# Patient Record
Sex: MALE | Race: WHITE | NOT HISPANIC OR LATINO | Employment: FULL TIME | URBAN - METROPOLITAN AREA
[De-identification: names, ages, dates, MRNs, and addresses within clinical notes are randomized per-mention and may not be internally consistent; named-entity substitution may affect disease eponyms.]

---

## 2019-06-06 ENCOUNTER — TRANSCRIBE ORDERS (OUTPATIENT)
Dept: ADMINISTRATIVE | Facility: HOSPITAL | Age: 54
End: 2019-06-06

## 2019-06-06 DIAGNOSIS — N28.89 RENAL MASS: Primary | ICD-10-CM

## 2019-06-19 ENCOUNTER — HOSPITAL ENCOUNTER (OUTPATIENT)
Dept: CT IMAGING | Facility: HOSPITAL | Age: 54
Discharge: HOME | End: 2019-06-19
Admitting: FAMILY MEDICINE

## 2019-06-19 DIAGNOSIS — N28.89 RENAL MASS: ICD-10-CM

## 2019-06-19 LAB — CREAT BLDA-MCNC: 1.4 MG/DL (ref 0.6–1.3)

## 2019-06-19 PROCEDURE — 82565 ASSAY OF CREATININE: CPT

## 2019-06-19 PROCEDURE — 0 IOPAMIDOL PER 1 ML: Performed by: FAMILY MEDICINE

## 2019-06-19 PROCEDURE — 74170 CT ABD WO CNTRST FLWD CNTRST: CPT

## 2019-06-19 RX ADMIN — IOPAMIDOL 95 ML: 755 INJECTION, SOLUTION INTRAVENOUS at 16:02

## 2022-03-16 ENCOUNTER — OFFICE VISIT (OUTPATIENT)
Dept: SLEEP MEDICINE | Facility: HOSPITAL | Age: 57
End: 2022-03-16

## 2022-03-16 VITALS
OXYGEN SATURATION: 96 % | SYSTOLIC BLOOD PRESSURE: 133 MMHG | WEIGHT: 179.6 LBS | BODY MASS INDEX: 27.31 KG/M2 | HEART RATE: 66 BPM | DIASTOLIC BLOOD PRESSURE: 81 MMHG

## 2022-03-16 DIAGNOSIS — G47.33 OBSTRUCTIVE SLEEP APNEA, ADULT: Primary | ICD-10-CM

## 2022-03-16 DIAGNOSIS — E66.3 OVERWEIGHT: ICD-10-CM

## 2022-03-16 PROCEDURE — 99203 OFFICE O/P NEW LOW 30 MIN: CPT | Performed by: INTERNAL MEDICINE

## 2022-03-16 RX ORDER — AMLODIPINE BESYLATE 10 MG/1
TABLET ORAL
COMMUNITY
Start: 2022-03-13

## 2022-03-16 RX ORDER — LANOLIN ALCOHOL/MO/W.PET/CERES
1 CREAM (GRAM) TOPICAL 3 TIMES DAILY
COMMUNITY

## 2022-03-16 RX ORDER — POTASSIUM CHLORIDE 20 MEQ/1
TABLET, EXTENDED RELEASE ORAL
COMMUNITY
Start: 2021-12-12

## 2022-03-16 RX ORDER — MULTIPLE VITAMINS W/ MINERALS TAB 9MG-400MCG
1 TAB ORAL DAILY
COMMUNITY

## 2022-03-16 RX ORDER — FUROSEMIDE 40 MG/1
TABLET ORAL
COMMUNITY
Start: 2021-12-12

## 2022-03-30 NOTE — PROGRESS NOTES
Chief Complaint  Snoring and obstructive sleep apnea  Subjective         Maksim Chris presents to [facility] for the evaluation of snoring and obstructive sleep apnea.  His primary care physician is Dr. Mari.  He is seen in person in the sleep clinic.  History of Present Illness  Patient had a history of snoring for about 10 years.  He had a sleep study in this clinic in 2015 and was diagnosed with severe obstructive sleep apnea.  He titrated to his BiPAP machine.  He apparently used it fairly regularly until 2020.  He had some problem with humidifier.  He is apparently been diagnosed with diabetes and he is interested in trying to get back on BiPAP.    He still has snoring and apneas noted.  He denies awakening gasping for breath.  He is not rested on arising in the morning.  He denies morning headaches.    He will awaken with a sore throat.  He has broken his nose previously.  He has been told he had a deviated septum and he has occasional trouble breathing through his nose.  He has a history of nasal allergies.  He will fall asleep if sitting quietly during the day.  He denies problems driving.  He has occasional kicking of his legs at night.  He denies that it keeps him awake.  He denies having chronic pain that keeps him awake.    He goes to bed between 10:30 PM and 12:30 AM.  He will fall asleep in 30 minutes.  He awakens once during the night.  He thinks he gets 4 to 6 hours of sleep but is not rested.  He says his dog sometimes keep him awake at night.  He has had hypertension known for 30 years.  He has had diabetes known for 2 years.  He denies any history of known coronary artery disease.  He poorly has occasional PVCs.    Past medical history:    Allergies: He is allergic to dust and pollen    Habits: Smoking: Without    Ethanol: Without    Caffeine: He has 2-3 abiola per day    Medical illnesses: He has history of diabetes and hypertension    Medications:amLODIPine  (NORVASC) 10 MG tablet    ASPIRIN 81 PO    furosemide (LASIX) 40 MG tablet    glucosamine-chondroitin 500-400 MG per tablet    metFORMIN (GLUCOPHAGE) 500 MG tablet    multivitamin with minerals (MULTIVITAMIN ADULT PO)    Omega-3 Fatty Acids (FISH OIL PO)    potassium chloride (K-DUR,KLOR-CON) 20 MEQ CR tablet      Surgeries: Has had hernia repair and wisdom teeth extraction    Family history: Positive for diabetes, heart disease, hypertension, stroke, obesity, sleep apnea, cancer.    Review of systems: Positives include fatigue, sore throat, eye itching, apneas, environmental allergies, decreased concentration.  Other systems reviewed and negative.    Collins score is 11/24  Objective   Vital Signs:   /81   Pulse 66   Wt 81.5 kg (179 lb 9.6 oz)   SpO2 96%   BMI 27.31 kg/m²          Physical Exam patient appears to be awake and alert.  He does not appear to be in acute respiratory distress.    He is normocephalic.  He has Mallampati class II anatomy.    Lungs are clear.    Cardiac exam revealed normal S1-S2.    Extremities showed no edema.  Result Review :    Patient sleep study April 24, 2015.  He had an AHI of 37.1.  His weight was 226 pounds.  He titrated to bilevel of 14/10     Assessment and Plan  Diagnoses and all orders for this visit:    1. Obstructive sleep apnea, adult (Primary)  -     Detailed AutoPAP Order    2. Overweight    Patient has a history of severe obstructive sleep apnea.  He still has a history of snoring and observed apneas.  He has lost a significant amount of weight since his previous study.  He is interested in getting back on therapy however.  We will place an order for new bilevel machine new supplies.  We have discussed other potential therapies including weight control, oral appliance, and surgery.  We have discussed the long-term consequences of untreated obstructive sleep apnea.  These include hypertension, diabetes, heart disease, stroke, and dementia.  He is encouraged to  achieve ideal body weight.  He is encouraged to avoid alcohol and sedatives close to bedtime.  He is encouraged to practice lateral position sleep.  We will see him back then in roughly 2 months after he gets his new machine and we can download the machine to make certain it is being effective.  I spent 35 minutes caring for Maksim on this date of service. This time includes time spent by me in the following activities:reviewing tests, obtaining and/or reviewing a separately obtained history, performing a medically appropriate examination and/or evaluation , counseling and educating the patient/family/caregiver, ordering medications, tests, or procedures and documenting information in the medical record  Follow Up   Return in about 2 months (around 5/16/2022) for 31 to 90 days after PAP setup, Next scheduled follow-up.  Patient was given instructions and counseling regarding his condition or for health maintenance advice. Please see specific information pulled into the AVS if appropriate.   Darion Parker MD [facility]  Sleep Medicine  Pulmonary and Critical Care Medicine

## 2022-04-19 ENCOUNTER — TELEPHONE (OUTPATIENT)
Dept: SLEEP MEDICINE | Facility: HOSPITAL | Age: 57
End: 2022-04-19

## 2022-04-19 NOTE — TELEPHONE ENCOUNTER
PATIENT CALLED AND LVM, RETURNED PATIENTS CALL CONCERNING ORDER FOR CPAP AND COMPLIANCE FU. PATIENTS ORDER WAS FAXED ON 03/30/22 TO [facility], I WENT OVER THIS WITH THE PATIENT AND HE CONFIRMED UNDERSTANDING AND WILL CALL [facility]. I CANCELLED PATIENTS COMPLIANCE FU FOR MAY 19TH AND REQUESTED PATIENT CALL AS SOON AS HE IS SET UP WITH HIS CPAP. PATIENT AGREED.

## 2022-04-20 NOTE — TELEPHONE ENCOUNTER
[facility] STATED THEY NEVER REC'D ORDER AND CHART INFO FAXED TO THEM ON 03/30/22.  WE HAVE FAXED THE NEW CPAP ORDER AND CHART INFO AGAIN ALONG WITH THE CONFIRMATION FAX SHEET FROM 03/30/22

## 2022-07-26 ENCOUNTER — OFFICE VISIT (OUTPATIENT)
Dept: SLEEP MEDICINE | Facility: HOSPITAL | Age: 57
End: 2022-07-26

## 2022-07-26 VITALS
WEIGHT: 180.6 LBS | HEART RATE: 78 BPM | OXYGEN SATURATION: 97 % | BODY MASS INDEX: 26.75 KG/M2 | DIASTOLIC BLOOD PRESSURE: 71 MMHG | HEIGHT: 69 IN | SYSTOLIC BLOOD PRESSURE: 135 MMHG

## 2022-07-26 DIAGNOSIS — G47.33 OBSTRUCTIVE SLEEP APNEA, ADULT: Primary | ICD-10-CM

## 2022-07-26 PROCEDURE — 99213 OFFICE O/P EST LOW 20 MIN: CPT | Performed by: NURSE PRACTITIONER

## 2022-07-26 NOTE — PROGRESS NOTES
Sleep Clinic Follow Up Note    Chief Complaint  Follow-up    Subjective     History of Present Illness (from previous evaluation on 3/30/2022):  Patient had a history of snoring for about 10 years.  He had a sleep study in this clinic in 2015 and was diagnosed with severe obstructive sleep apnea.  He titrated to his BiPAP machine.  He apparently used it fairly regularly until 2020.  He had some problem with humidifier.  He is apparently been diagnosed with diabetes and he is interested in trying to get back on BiPAP.     He still has snoring and apneas noted.  He denies awakening gasping for breath.  He is not rested on arising in the morning.  He denies morning headaches.     He will awaken with a sore throat.  He has broken his nose previously.  He has been told he had a deviated septum and he has occasional trouble breathing through his nose.  He has a history of nasal allergies.  He will fall asleep if sitting quietly during the day.  He denies problems driving.  He has occasional kicking of his legs at night.  He denies that it keeps him awake.  He denies having chronic pain that keeps him awake.     He goes to bed between 10:30 PM and 12:30 AM.  He will fall asleep in 30 minutes.  He awakens once during the night.  He thinks he gets 4 to 6 hours of sleep but is not rested.  He says his dog sometimes keep him awake at night.  He has had hypertension known for 30 years.  He has had diabetes known for 2 years.  He denies any history of known coronary artery disease.  He poorly has occasional PVCs.  (End copied text)    Interval History:  Maksim Chris is a 57 y.o. male returns for follow up and compliance of CPAP therapy. The patient was last seen on 3/30/22. Overall the patient feels good with regard to therapy though he does continue to have some difficulty getting used to it.  He reports issues with he has headgear causing some pain when he wakes. The device appears to be working appropriately otherwise.  On average the patient sleeps 5 hours per night. They wake  times per night. He has been trying to exercise in order to be tired enough to sleep.The patient reports the following changes to their medical and medication history since they were last seen: None    Further details are as follows:    [place] Scale is: 10/24      1. Sitting and Readin. Watching TV:  3. Sitting quietly in a public place:   4. As a passanger in a car for an hour:  5. Lying down in the afternoon to rest:  6. Sitting and talking to someone:  7. Sitting quietly after lunch without alcohol:  8. In a car while stopped in traffic for a few minutes:    Weight:  Current Weight: 175 lb    Weight change in the last year:  loss: 70 lbs apx    The patient's relevant past medical, surgical, family, and social history reviewed and updated in Epic as appropriate.    PMH:    Past Medical History:   Diagnosis Date   • Diabetes mellitus (HCC)    • Hypertension      Past Surgical History:   Procedure Laterality Date   • UMBILICAL HERNIA REPAIR     • WISDOM TOOTH EXTRACTION         No Known Allergies    MEDS:  Prior to Admission medications    Medication Sig Start Date End Date Taking? Authorizing Provider   amLODIPine (NORVASC) 10 MG tablet  3/13/22  Yes Adolph Lugo MD   ASPIRIN 81 PO Take  by mouth.   Yes Adolph Lugo MD   furosemide (LASIX) 40 MG tablet  21  Yes Adolph Lugo MD   glucosamine-chondroitin 500-400 MG per tablet Take 1 tablet by mouth 3 (Three) Times a Day.   Yes Adolph Lugo MD   metFORMIN (GLUCOPHAGE) 500 MG tablet Take 500 mg by mouth. 9/15/21 9/15/22 Yes Adolph Lugo MD   multivitamin with minerals tablet tablet Take 1 tablet by mouth Daily.   Yes Adolph Lugo MD   Omega-3 Fatty Acids (FISH OIL PO) Take  by mouth.   Yes Adolph Lugo MD   potassium chloride (K-DUR,KLOR-CON) 20 MEQ CR tablet  21  Yes Adolph Lugo MD         FH:  Family History   Problem  "Relation Age of Onset   • Hypertension Mother    • Cancer Father    • Hypertension Father        Objective   Vital Signs:  /71   Pulse 78   Ht 175.3 cm (69\")   Wt 81.9 kg (180 lb 9.6 oz)   SpO2 97%   BMI 26.67 kg/m²     BMI is >= 25 and <30. (Overweight) The following options were offered after discussion;: He has been working on exercise and diet.  Patient has lost approximately 70 pounds.        Physical Exam  Constitutional:       Appearance: Normal appearance.   HENT:      Head: Normocephalic and atraumatic.      Nose: Nose normal.      Mouth/Throat:      Mouth: Mucous membranes are dry.   Cardiovascular:      Rate and Rhythm: Normal rate and regular rhythm.      Heart sounds: No murmur heard.    No friction rub. No gallop.   Pulmonary:      Effort: Pulmonary effort is normal. No respiratory distress.      Breath sounds: Normal breath sounds. No wheezing or rhonchi.   Neurological:      Mental Status: He is alert and oriented to person, place, and time.   Psychiatric:         Behavior: Behavior normal.         Mallampati Score: III (soft and hard palate and base of uvula visible)    CPAP Report:  AHI: 1.0  Days of Usage: 59/60 (98%)  95th percentile pressure: 31.6 cm H2O  Number of Days Greater than 4 hours: 57/60 (95%)  Settings: Mode-V auto, max IPAP 16 cm H2O, min EPAP 6 cm H2O, pressure support 6 cm H2O    Result Review :              Assessment and Plan  This is a very pleasant 57-year-old male who returns for compliance check.  He has been exercising and trying to keep to a normal sleep schedule.  He will contact his [employer] company to see about obtaining a new/different mask and headgear.  Patient will follow-up in 1 year or call sooner should he have questions or need assistance.    Diagnoses and all orders for this visit:    1. Obstructive sleep apnea, adult (Primary)  -     PAP Therapy           The patient continues to use and benefit from CPAP therapy.    1. The patient was counseled regarding " multimodal approach with healthy nutrition, healthy sleep, regular physical activity, social activities, counseling, and medications. Encouraged to practice lateral sleep position. Avoid alcohol and sedatives close to bedtime.     2.  We will refill supplies x1 year.  Return to clinic 1 year or sooner if symptoms warrant.  Patient gave verbal consent today for video visit. I have reviewed the results of my evaluation and impression and discussed my recommendations in detail with the patient.    Follow Up  Return in about 1 year (around 7/26/2023).  Patient was given instructions and counseling regarding his condition or for health maintenance advice. Please see specific information pulled into the AVS if appropriate.       GAETANO Webb, [facility]-BC  Pulmonology, Critical Care, and Sleep Medicine  Electronically signed by GAETANO Fong, 07/26/22, 11:06 AM EDT.

## 2023-07-25 NOTE — PROGRESS NOTES
Sleep Clinic Follow Up Note    Chief Complaint  Follow-up    Subjective     History of Present Illness (from previous encounter on 7/26/2022):  Maksim Chris is a 57 y.o. male returns for follow up and compliance of CPAP therapy. The patient was last seen on 3/30/22. Overall the patient feels good with regard to therapy though he does continue to have some difficulty getting used to it.  He reports issues with he has headgear causing some pain when he wakes. The device appears to be working appropriately otherwise. On average the patient sleeps 5 hours per night. They wake  times per night. He has been trying to exercise in order to be tired enough to sleep.The patient reports the following changes to their medical and medication history since they were last seen: None (End copied text).    Interval History:  Maksim Chris is a 58 y.o. male returns for follow up and compliance of CPAP therapy. The patient was last seen on 7/26/2022. Overall the patient feels with regard to therapy. The device appears to be working appropriately. On average the patient sleeps 5 hours per night. The patient wakes 1-2 times per night.     The patient reports the following changes to their medical and medication history since they were last seen:  None    Further details are as follows:      [place] Scale is (out of 24): Total score: 11     Weight:  Current Weight: 187 lb    Weight change in the last year:  gain: 0 lbs    The patient's relevant past medical, surgical, family, and social history reviewed and updated in Epic as appropriate.    PMH:    Past Medical History:   Diagnosis Date    Diabetes mellitus     Hypertension      Past Surgical History:   Procedure Laterality Date    UMBILICAL HERNIA REPAIR      WISDOM TOOTH EXTRACTION         No Known Allergies    MEDS:  Prior to Admission medications    Medication Sig Start Date End Date Taking? Authorizing Provider   amLODIPine (NORVASC) 10 MG tablet  3/13/22   Provider,  "MD Adolph   ASPIRIN 81 PO Take  by mouth.    Adolph Lugo MD   furosemide (LASIX) 40 MG tablet  12/12/21   Adolph Lugo MD   glucosamine-chondroitin 500-400 MG per tablet Take 1 tablet by mouth 3 (Three) Times a Day.    Adolph Lugo MD   metFORMIN (GLUCOPHAGE) 500 MG tablet Take 500 mg by mouth. 9/15/21 9/15/22  Adolph Lugo MD   multivitamin with minerals tablet tablet Take 1 tablet by mouth Daily.    Adolph Lugo MD   Omega-3 Fatty Acids (FISH OIL PO) Take  by mouth.    Adolph Lugo MD   potassium chloride (K-DUR,KLOR-CON) 20 MEQ CR tablet  12/12/21   Adolph Lugo MD         FH:  Family History   Problem Relation Age of Onset    Hypertension Mother     Cancer Father     Hypertension Father        Objective   Vital Signs:  /78 (BP Location: Left arm, Patient Position: Sitting)   Pulse 69   Ht 175.3 cm (69\")   Wt 85.1 kg (187 lb 9.6 oz)   SpO2 97%   BMI 27.70 kg/m²     BMI is >= 25 and <30. (Overweight) The following options were offered after discussion;: working on diet and exercise.         Physical Exam  Vitals reviewed.   Constitutional:       Appearance: Normal appearance.   HENT:      Head: Normocephalic and atraumatic.      Nose: Nose normal.      Mouth/Throat:      Mouth: Mucous membranes are moist.   Cardiovascular:      Rate and Rhythm: Normal rate and regular rhythm.      Heart sounds: No murmur heard.    No friction rub. No gallop.   Pulmonary:      Effort: Pulmonary effort is normal. No respiratory distress.      Breath sounds: Normal breath sounds. No wheezing or rhonchi.   Neurological:      Mental Status: He is alert and oriented to person, place, and time.   Psychiatric:         Behavior: Behavior normal.             Result Review :           CPAP Report:  AHI: 0.8/h  Days of Usage: 62/90 (69%)  Number of Days Greater than 4 hours: 53/90 (59%)  Average time (days used): 5 hours  95th Percentile leak: 42.7 " L/min  Settings: V auto-Max IPAP 16 cm H2O, min EPAP 6 cm H2O, pressure support 6 cm H2O.       Assessment and Plan  Maksim Chris is a 58 y.o. male who returns for follow-up compliance of PAP therapy.  Pap report has been reviewed.  Overall usage is at 69% with compliance at 59%.  I have encouraged increase usage.  Patient averages 5 hours on nights used.  He has a large leak at 42.7 L/min.  AHI is 0.8/H with usage.  We have discussed changing masks. I will refill the patient's supplies, and they will return for follow-up and compliance in 1 year or sooner should they have further questions or concerns. He feels that he benefits from pap therapy.     Diagnoses and all orders for this visit:    1. Obstructive sleep apnea, adult (Primary)  -     PAP Therapy           The patient continues to use and benefit from CPAP therapy.    1. The patient was counseled regarding multimodal approach with healthy nutrition, healthy sleep, regular physical activity, social activities, counseling, and medications. Encouraged to practice lateral sleep position. Avoid alcohol and sedatives close to bedtime.     2.  We will refill supplies x1 year.  Return to clinic 1 year or sooner if symptoms warrant. I have reviewed the results of my evaluation and impression and discussed my recommendations in detail with the patient.           Follow Up  Return in about 1 year (around 7/26/2024) for Annual visit.  Patient was given instructions and counseling regarding his condition or for health maintenance advice. Please see specific information pulled into the AVS if appropriate.       GAETANO Webb, ACNP-BC  Pulmonology, Critical Care, and Sleep Medicine

## 2023-07-26 ENCOUNTER — OFFICE VISIT (OUTPATIENT)
Dept: SLEEP MEDICINE | Facility: HOSPITAL | Age: 58
End: 2023-07-26
Payer: COMMERCIAL

## 2023-07-26 VITALS
WEIGHT: 187.6 LBS | DIASTOLIC BLOOD PRESSURE: 78 MMHG | SYSTOLIC BLOOD PRESSURE: 125 MMHG | BODY MASS INDEX: 27.78 KG/M2 | HEIGHT: 69 IN | HEART RATE: 69 BPM | OXYGEN SATURATION: 97 %

## 2023-07-26 DIAGNOSIS — G47.33 OBSTRUCTIVE SLEEP APNEA, ADULT: Primary | ICD-10-CM

## 2024-02-21 ENCOUNTER — HOSPITAL ENCOUNTER (EMERGENCY)
Age: 59
Discharge: HOME | End: 2024-02-21
Attending: EMERGENCY MEDICINE

## 2024-02-21 VITALS
SYSTOLIC BLOOD PRESSURE: 165 MMHG | OXYGEN SATURATION: 97 % | DIASTOLIC BLOOD PRESSURE: 79 MMHG | HEIGHT: 66 IN | HEART RATE: 80 BPM | TEMPERATURE: 97.9 F | BODY MASS INDEX: 29.73 KG/M2 | WEIGHT: 185 LBS | RESPIRATION RATE: 18 BRPM

## 2024-02-21 DIAGNOSIS — S05.01XA ABRASION OF RIGHT CORNEA, INITIAL ENCOUNTER: Primary | ICD-10-CM

## 2024-02-21 PROCEDURE — 99282 EMERGENCY DEPT VISIT SF MDM: CPT

## 2024-02-21 PROCEDURE — 10002801 HB RX 250 W/O HCPCS

## 2024-02-21 RX ORDER — PROPARACAINE HYDROCHLORIDE 5 MG/ML
1 SOLUTION/ DROPS OPHTHALMIC ONCE
Status: COMPLETED | OUTPATIENT
Start: 2024-02-21 | End: 2024-02-21

## 2024-02-21 RX ORDER — PROPARACAINE HYDROCHLORIDE 5 MG/ML
SOLUTION/ DROPS OPHTHALMIC
Status: COMPLETED
Start: 2024-02-21 | End: 2024-02-21

## 2024-02-21 RX ORDER — ERYTHROMYCIN 5 MG/G
OINTMENT OPHTHALMIC
Qty: 3.5 G | Refills: 0 | Status: SHIPPED | OUTPATIENT
Start: 2024-02-21

## 2024-02-21 RX ADMIN — PROPARACAINE HYDROCHLORIDE 1 DROP: 5 SOLUTION/ DROPS OPHTHALMIC at 12:07

## 2024-02-21 SDOH — SOCIAL STABILITY: SOCIAL INSECURITY: HOW OFTEN DOES ANYONE, INCLUDING FAMILY AND FRIENDS, PHYSICALLY HURT YOU?: NEVER

## 2024-02-21 SDOH — SOCIAL STABILITY: SOCIAL INSECURITY: HOW OFTEN DOES ANYONE, INCLUDING FAMILY AND FRIENDS, THREATEN YOU WITH HARM?: NEVER

## 2024-02-21 SDOH — SOCIAL STABILITY: SOCIAL INSECURITY: HOW OFTEN DOES ANYONE, INCLUDING FAMILY AND FRIENDS, INSULT OR TALK DOWN TO YOU?: NEVER

## 2024-02-21 SDOH — SOCIAL STABILITY: SOCIAL INSECURITY: HOW OFTEN DOES ANYONE, INCLUDING FAMILY AND FRIENDS, SCREAM OR CURSE AT YOU?: NEVER

## 2024-02-21 ASSESSMENT — ENCOUNTER SYMPTOMS
CONFUSION: 0
VOMITING: 0
NEUROLOGICAL NEGATIVE: 1
COLOR CHANGE: 0
FACIAL SWELLING: 0
FEVER: 0
TREMORS: 0
WHEEZING: 0
EYE PAIN: 1
RHINORRHEA: 0
CHILLS: 0
NAUSEA: 0
SPEECH DIFFICULTY: 0
SHORTNESS OF BREATH: 0
ACTIVITY CHANGE: 0
EYE DISCHARGE: 1
PSYCHIATRIC NEGATIVE: 1
AGITATION: 0
EYE REDNESS: 0

## 2024-02-21 ASSESSMENT — PAIN SCALES - GENERAL
PAINLEVEL_OUTOF10: 9
PAINLEVEL_OUTOF10: 10

## 2024-02-21 ASSESSMENT — VISUAL ACUITY: OU: 1

## 2024-07-22 NOTE — PROGRESS NOTES
Sleep Clinic Follow Up Note    Chief Complaint  Follow-up and Sleep Apnea    Subjective     History of Present Illness (from previous encounter on 7/26/2023):  Maksim Chris is a 58 y.o. male who returns for follow-up compliance of PAP therapy.  Pap report has been reviewed.  Overall usage is at 69% with compliance at 59%.  I have encouraged increase usage.  Patient averages 5 hours on nights used.  He has a large leak at 42.7 L/min.  AHI is 0.8/H with usage.  We have discussed changing masks. I will refill the patient's supplies, and they will return for follow-up and compliance in 1 year or sooner should they have further questions or concerns. He feels that he benefits from pap therapy. (End copied text).    Interval History:  Maksim Chris is a 59 y.o. male returns for follow up and compliance of PAP therapy. The patient was last seen on 7/26/2023 by me. Overall the patient feels good with regard to therapy. The device appears to be working appropriately. On average the patient sleeps 4-6 hours per night. The patient wakes 0 times per night. He will fall asleep on the couch some times and not use the device.    The patient reports the following changes to their medical and medication history since they were last seen:  None      Further details are as follows:      [place] Scale is (out of 24): Total score: 12     Weight:  Current Weight: 205 lb      The patient's relevant past medical, surgical, family, and social history reviewed and updated in Epic as appropriate.    PMH:    Past Medical History:   Diagnosis Date    Diabetes mellitus     Hypertension      Past Surgical History:   Procedure Laterality Date    UMBILICAL HERNIA REPAIR      WISDOM TOOTH EXTRACTION         Allergies   Allergen Reactions    Clindamycin Rash       MEDS:  Prior to Admission medications    Medication Sig Start Date End Date Taking? Authorizing Provider   amLODIPine (NORVASC) 10 MG tablet  3/13/22   Provider, MD Adolph  "  ASPIRIN 81 PO Take  by mouth.    Adolph Lugo MD   furosemide (LASIX) 40 MG tablet  12/12/21   Adolph Lugo MD   glucosamine-chondroitin 500-400 MG per tablet Take 1 tablet by mouth 3 (Three) Times a Day.    Adolph Lugo MD   metFORMIN (GLUCOPHAGE) 500 MG tablet Take 500 mg by mouth. 9/15/21 9/15/22  Adolph Lugo MD   multivitamin with minerals tablet tablet Take 1 tablet by mouth Daily.    Adolph Lugo MD   Omega-3 Fatty Acids (FISH OIL PO) Take  by mouth.    Adolph Lugo MD   potassium chloride (K-DUR,KLOR-CON) 20 MEQ CR tablet  12/12/21   Adolph Lugo MD         FH:  Family History   Problem Relation Age of Onset    Hypertension Mother     Cancer Father     Hypertension Father        Objective   Vital Signs:  /40   Pulse 71   Temp 96.9 °F (36.1 °C) (Temporal)   Ht 175.3 cm (69.02\")   Wt 93.1 kg (205 lb 4.8 oz)   SpO2 95%   BMI 30.30 kg/m²              Physical Exam  Vitals reviewed.   Constitutional:       Appearance: Normal appearance.   HENT:      Head: Normocephalic and atraumatic.      Nose: Nose normal.      Mouth/Throat:      Mouth: Mucous membranes are moist.   Cardiovascular:      Rate and Rhythm: Normal rate and regular rhythm.      Heart sounds: No murmur heard.     No friction rub. No gallop.   Pulmonary:      Effort: Pulmonary effort is normal. No respiratory distress.      Breath sounds: Normal breath sounds. No wheezing or rhonchi.   Neurological:      Mental Status: He is alert and oriented to person, place, and time.   Psychiatric:         Behavior: Behavior normal.             Result Review :           PAP Report:  AHI: 0.6/h  Days of Usage: 66/90 (73%)  Number of Days Greater than 4 hours: 60/90 (67%)  Average time (days used): 5 hours 19 minutes  95th percentile leaks: 22.3 L/min  Settings: V auto-Max IPAP 16 cm H2O, min EPAP 6 cm H2O, pressure support 6 cm H2O.       Assessment and Plan  Maksim Chris is a 59 y.o. " male who returns for follow-up and compliance of PAP therapy.  The Pap report has been reviewed.  Overall usage is at 73% with compliance at 67%.  The patient averages 5 hours and 19 minutes of therapy.  Sleep apnea is well-controlled with an AHI of 0.6/H. I will refill the patient's supplies, and I have asked them to return for follow-up and compliance in 1 year or sooner should they have further questions or concerns.    Diagnoses and all orders for this visit:    1. Obstructive sleep apnea, adult (Primary)  -     PAP Therapy    2. Obesity (BMI 30.0-34.9)         The patient continues to use and benefit from PAP therapy.    1. The patient was counseled regarding multimodal approach with healthy nutrition, healthy sleep, regular physical activity, social activities, counseling, and medications. Encouraged to practice lateral sleep position. Avoid alcohol and sedatives close to bedtime.     2.  We will refill supplies x1 year.  Return to clinic 1 year or sooner if symptoms warrant. I have reviewed the results of my evaluation and impression and discussed my recommendations in detail with the patient.           Follow Up  Return in about 1 year (around 7/24/2025) for Annual visit.  Patient was given instructions and counseling regarding his condition or for health maintenance advice. Please see specific information pulled into the AVS if appropriate.       GAETANO Webb, ACNP-BC  Pulmonology, Critical Care, and Sleep Medicine

## 2024-07-24 ENCOUNTER — OFFICE VISIT (OUTPATIENT)
Dept: SLEEP MEDICINE | Age: 59
End: 2024-07-24
Payer: COMMERCIAL

## 2024-07-24 VITALS
HEIGHT: 69 IN | HEART RATE: 71 BPM | OXYGEN SATURATION: 95 % | TEMPERATURE: 96.9 F | WEIGHT: 205.3 LBS | SYSTOLIC BLOOD PRESSURE: 170 MMHG | DIASTOLIC BLOOD PRESSURE: 40 MMHG | BODY MASS INDEX: 30.41 KG/M2

## 2024-07-24 DIAGNOSIS — E66.9 OBESITY (BMI 30.0-34.9): ICD-10-CM

## 2024-07-24 DIAGNOSIS — G47.33 OBSTRUCTIVE SLEEP APNEA, ADULT: Primary | ICD-10-CM

## 2024-07-24 PROCEDURE — 99213 OFFICE O/P EST LOW 20 MIN: CPT | Performed by: NURSE PRACTITIONER

## 2024-07-24 RX ORDER — AMOXICILLIN 500 MG
CAPSULE ORAL
COMMUNITY